# Patient Record
Sex: FEMALE | Race: WHITE | Employment: UNEMPLOYED | URBAN - METROPOLITAN AREA
[De-identification: names, ages, dates, MRNs, and addresses within clinical notes are randomized per-mention and may not be internally consistent; named-entity substitution may affect disease eponyms.]

---

## 2017-11-22 PROBLEM — K92.2 GASTROINTESTINAL BLEED: Status: ACTIVE | Noted: 2017-11-22

## 2017-11-24 ENCOUNTER — TELEPHONE (OUTPATIENT)
Dept: PULMONOLOGY | Age: 67
End: 2017-11-24

## 2017-11-24 DIAGNOSIS — R91.1 PULMONARY NODULE: Primary | ICD-10-CM

## 2017-11-24 DIAGNOSIS — J44.9 CHRONIC OBSTRUCTIVE PULMONARY DISEASE, UNSPECIFIED COPD TYPE (HCC): ICD-10-CM

## 2017-12-01 NOTE — TELEPHONE ENCOUNTER
ORDERS PENDING. Per hospital d/c notes, pt was sent to [id number] [place] [address] home. [phone]. Scheduled CT chest for 3/8/18 at 9:30 am.  Pt scheduled for 3/8/18 at 10:00 am.  F/u with Dr. Bernadine Tolbert will follow at 11:00 am.  Spoke with Elsa Tamayo and she was informed of appts.

## 2018-03-08 ENCOUNTER — TELEPHONE (OUTPATIENT)
Dept: PULMONOLOGY | Age: 68
End: 2018-03-08

## 2018-03-08 NOTE — TELEPHONE ENCOUNTER
Patient did not show for 3 month PFT/CT fua  with  on 3/8/18        LOV   ASSESSMENT:11/24/17  hospital note  · GI Bleed -source probably lower but unclear  · Esophageal candidiasis  · COPD with recent AE  · Pneumonia with diffuse centrilobular nodules  · Chronic hypoxia on home 2lpm  · Lung nodule, 4mm  · Chronic back pain        PLAN:  · Could probably wean off of O2  · Completed 7-8 dayss vanc/zosyn/Levaquin   · Continue home COPD regimen   · Complete prednisone taper, at 10mg and off tomorrow  · Repeat Chest CT in 3 months